# Patient Record
Sex: FEMALE | Race: WHITE | Employment: FULL TIME | URBAN - METROPOLITAN AREA
[De-identification: names, ages, dates, MRNs, and addresses within clinical notes are randomized per-mention and may not be internally consistent; named-entity substitution may affect disease eponyms.]

---

## 2024-04-25 ENCOUNTER — HOSPITAL ENCOUNTER (EMERGENCY)
Age: 24
Discharge: HOME | End: 2024-04-25
Payer: COMMERCIAL

## 2024-04-25 VITALS
WEIGHT: 244.71 LBS | SYSTOLIC BLOOD PRESSURE: 103 MMHG | OXYGEN SATURATION: 98 % | TEMPERATURE: 97.8 F | HEART RATE: 83 BPM | RESPIRATION RATE: 17 BRPM | HEIGHT: 70 IN | BODY MASS INDEX: 35.03 KG/M2 | DIASTOLIC BLOOD PRESSURE: 66 MMHG

## 2024-04-25 DIAGNOSIS — R55 SYNCOPE AND COLLAPSE: Primary | ICD-10-CM

## 2024-04-25 LAB
ANION GAP SERPL CALCULATED.3IONS-SCNC: 15 MMOL/L (ref 3–16)
BASOPHILS # BLD: 0.1 K/UL (ref 0–0.2)
BASOPHILS NFR BLD: 0.4 %
BUN SERPL-MCNC: 8 MG/DL (ref 7–20)
CALCIUM SERPL-MCNC: 9.9 MG/DL (ref 8.3–10.6)
CHLORIDE SERPL-SCNC: 101 MMOL/L (ref 99–110)
CO2 SERPL-SCNC: 22 MMOL/L (ref 21–32)
CREAT SERPL-MCNC: 0.5 MG/DL (ref 0.6–1.1)
DEPRECATED RDW RBC AUTO: 13.5 % (ref 12.4–15.4)
EOSINOPHIL # BLD: 0 K/UL (ref 0–0.6)
EOSINOPHIL NFR BLD: 0.1 %
GFR SERPLBLD CREATININE-BSD FMLA CKD-EPI: >90 ML/MIN/{1.73_M2}
GLUCOSE SERPL-MCNC: 118 MG/DL (ref 70–99)
HCT VFR BLD AUTO: 41.2 % (ref 36–48)
HGB BLD-MCNC: 13.5 G/DL (ref 12–16)
LYMPHOCYTES # BLD: 1.5 K/UL (ref 1–5.1)
LYMPHOCYTES NFR BLD: 7.8 %
MCH RBC QN AUTO: 28.7 PG (ref 26–34)
MCHC RBC AUTO-ENTMCNC: 32.9 G/DL (ref 31–36)
MCV RBC AUTO: 87.2 FL (ref 80–100)
MONOCYTES # BLD: 0.4 K/UL (ref 0–1.3)
MONOCYTES NFR BLD: 2.2 %
NEUTROPHILS # BLD: 17.3 K/UL (ref 1.7–7.7)
NEUTROPHILS NFR BLD: 89.5 %
PLATELET # BLD AUTO: 225 K/UL (ref 135–450)
PMV BLD AUTO: 10.2 FL (ref 5–10.5)
POTASSIUM SERPL-SCNC: 4.1 MMOL/L (ref 3.5–5.1)
RBC # BLD AUTO: 4.72 M/UL (ref 4–5.2)
SODIUM SERPL-SCNC: 138 MMOL/L (ref 136–145)
WBC # BLD AUTO: 19.3 K/UL (ref 4–11)

## 2024-04-25 PROCEDURE — 93005 ELECTROCARDIOGRAM TRACING: CPT | Performed by: EMERGENCY MEDICINE

## 2024-04-25 PROCEDURE — 80048 BASIC METABOLIC PNL TOTAL CA: CPT

## 2024-04-25 PROCEDURE — 99284 EMERGENCY DEPT VISIT MOD MDM: CPT

## 2024-04-25 PROCEDURE — 85025 COMPLETE CBC W/AUTO DIFF WBC: CPT

## 2024-04-25 ASSESSMENT — PAIN DESCRIPTION - PAIN TYPE: TYPE: ACUTE PAIN

## 2024-04-25 ASSESSMENT — PAIN DESCRIPTION - DESCRIPTORS: DESCRIPTORS: DISCOMFORT

## 2024-04-25 ASSESSMENT — PAIN - FUNCTIONAL ASSESSMENT: PAIN_FUNCTIONAL_ASSESSMENT: ACTIVITIES ARE NOT PREVENTED

## 2024-04-25 ASSESSMENT — PAIN SCALES - GENERAL: PAINLEVEL_OUTOF10: 4

## 2024-04-25 ASSESSMENT — PAIN DESCRIPTION - LOCATION: LOCATION: COCCYX

## 2024-04-25 ASSESSMENT — PAIN DESCRIPTION - ONSET: ONSET: SUDDEN

## 2024-04-26 LAB
EKG ATRIAL RATE: 78 BPM
EKG DIAGNOSIS: NORMAL
EKG P AXIS: 40 DEGREES
EKG P-R INTERVAL: 168 MS
EKG Q-T INTERVAL: 350 MS
EKG QRS DURATION: 76 MS
EKG QTC CALCULATION (BAZETT): 399 MS
EKG R AXIS: 28 DEGREES
EKG T AXIS: 48 DEGREES
EKG VENTRICULAR RATE: 78 BPM

## 2024-04-26 PROCEDURE — 93010 ELECTROCARDIOGRAM REPORT: CPT | Performed by: INTERNAL MEDICINE

## 2024-04-26 NOTE — ED PROVIDER NOTES
**ADVANCED PRACTICE PROVIDER, I HAVE EVALUATED THIS PATIENT**        [facility] EMERGENCY DEPARTMENT  EMERGENCY DEPARTMENT ENCOUNTER      Pt Name: Verena Faustin  MRN:[id number]  Birthdate 2000  Date of evaluation: 4/25/2024  Provider: Jose Shukla PA-C  Note Started: 11:12 PM EDT 4/25/24        Chief Complaint:    Chief Complaint   Patient presents with    Loss of Consciousness    Tailbone Pain         Nursing Notes, Past Medical Hx, Past Surgical Hx, Social Hx, Allergies, and Family Hx were all reviewed and agreed with or any disagreements were addressed in the HPI.    HPI: (Location, Duration, Timing, Severity, Quality, Assoc Sx, Context, Modifying factors)    History From: ***  {Limitations to history (Optional):42170}    {Social Determinants Significantly Affecting Health (Optional):57462}    Chief Complaint of ***    This is a  23 y.o. female who presents  ***    PastMedical/Surgical History:  History reviewed. No pertinent past medical history.  History reviewed. No pertinent surgical history.    Medications:  Previous Medications    No medications on file       Review of Systems:  (1 systems needed)  Review of Systems    \"Positives and Pertinent negatives as per HPI\"    Physical Exam:  Physical Exam    MEDICAL DECISION MAKING    Vitals:    Vitals:    04/25/24 2013 04/25/24 2144   BP: 103/66    Pulse: 83    Resp: 16 17   Temp: 97.8 °F (36.6 °C)    TempSrc: Oral    SpO2: 98% 98%   Weight: 111 kg (244 lb 11.4 oz)    Height: 1.778 m (5' 10\")        LABS:  Labs Reviewed   CBC WITH AUTO DIFFERENTIAL - Abnormal; Notable for the following components:       Result Value    WBC 19.3 (*)     Neutrophils Absolute 17.3 (*)     All other components within normal limits   BASIC METABOLIC PANEL - Abnormal; Notable for the following components:    Glucose 118 (*)     Creatinine 0.5 (*)     All other components within normal limits        Remainder of labs reviewed and were negative at this time or not  collapse        DISPOSITION Decision To Discharge 04/25/2024 11:12:33 PM      PATIENT REFERRED TO:  [facility] Emergency Department  [address] [address]  [phone]  Call   If symptoms worsen      DISCHARGE MEDICATIONS:  New Prescriptions    No medications on file       DISCONTINUED MEDICATIONS:  Discontinued Medications    No medications on file              (Please note the MDM and HPI sections of this note were completed with a voice recognition program.  Efforts were made to edit the dictations but occasionally words are mis-transcribed.)    Electronically signed, Jose Shukla PA-C,          Jose Shukla PA-C  05/03/24 0901

## 2024-04-26 NOTE — ED PROVIDER NOTES
I did not perform an evaluation of Verena Faustin but was asked to review her EKG.     EKG interpreted by myself.   Normal sinus rhythm with a rate of 78, normal axis, normal intervals, no ST elevations, no ST depressions, nonspecific T wave changes, no prior EKG on file.     Tray Simeon MD  04/25/24 2033

## 2024-04-26 NOTE — ED TRIAGE NOTES
Patient to the ER with complaints of syncopal episode after donating plasma.  Patient says she waited the required 20 minutes after donating, but when she got up to leave she lost her vision and fainted for approximately 15 seconds.  She does report hitting her head on the concrete floor during the episode.     At time of triage, patient is alert and oriented x4 and ambulatory.  She reports mild headache and some pain in her tailbone from the fall.

## 2024-04-26 NOTE — ED NOTES
D/C: Order noted for d/c. Pt confirmed d/c paperwork  have correct name. Discharge and education instructions reviewed with patient. Teach-back successful.  Pt verbalized understanding. Pt denied questions at this time. No acute distress noted. Patient instructed to follow-up as noted - return to emergency department if symptoms worsen. Patient verbalized understanding. Discharged per EDMD with discharge instructions. Pt discharged to private vehicle. Patient stable upon departure. Thanked patient for choosing [facility] for care.

## 2024-04-26 NOTE — DISCHARGE INSTRUCTIONS
Drink plenty of fluids  Return emergency room for any worsening  Get established with a primary care provider    [facility] Referral number [phone] for Primary Care      [facility] CLINICS/COMMUNITY HEALTH CENTERS  [facility]  [address] [address] [phone]  Fax [phone]  Medical, OB/Gyn, Pediatrics, [facility]  Serves all of [facility])  [address]  [place], [place] [address]  [phone]       [facility]  [address] [facility] (Administrative offices)  [phone]  Homeless only [facility] ([place])  [address] [facility] [facility], [place] [address]  [phone] or [phone], Singaporean [phone],   Dental Appointments [phone] or [phone]  Pediatric, Family Practice, X-Ray  Serves all of [facility] ([facility])  [address]  [place], [place] [address]  [phone]   [facility] [facility]   [address] [facility]    (Located in [facility])  [phone] or [phone], Singaporean [phone],   Dental Appointments [phone] or [phone]     [facility]  [address] [place], [place] [address]  [phone] [facility]  [address] [facility]  [phone]   [facility]  [address]  [id number] [phone] Fax [phone]  General Practice    Serves [place] and Surrounding areas [facility]  [address] [facility] [address]  [phone] Fax [phone]  Medical, OB/Gyn, Pediatrics  Dental Clinic, [facility] limits only     [facility]  [address] [facility] [address]  [phone] Fax [phone]  Family Practice, Pediatrics, OB/Gyn, [facility]  Dental Clinic [phone]  Serves [facility]  [address]    [phone]  Urgent Care, Open 24 hrs, Urgent care, Gyn, Prenatal, Dental Mental, Translators     [facility] [place]  [facility]   [address] [place] [address]. [place], [place] [phone]  (Located: [facility])  Pediatrics [phone], Romansh [phone],   Dental Appointments [phone] or [phone]  [facility] [phone] [facility]  [address] [address]  [phone]  Medical, OB/Gyn, Pediatrics, [facility]  Dental Clinic [phone]       [facility]  [address] [place]  [address]  [phone] Fax [phone]  Pediatrics, [facility]  [address] [address]  [phone]   Fax [phone]  Pediatrics, [facility]  [address] [address]  [phone]  Medical [address] [address]  [phone]  Pediatrics, Internal Med, OB/Gyn, [facility], Dental Clinic [phone]     [facility]  [address] [facility] [address]   [phone] [address] [address]  [phone] Fax [phone]  [facility]  Sliding scale fee  All [facility]  [address] [facility]  [place], [place] [address]  [phone]     [facility]  [address] [place] [address]  [phone]    [address]  [place], [place] [address]  [phone]         [facility] Subspecialties  [address] [place], [place] [address]  [facility]  [phone]  Fax:  [phone]  [facility]  [phone]  Fax:  [phone]  [facility]  Resident Practice  [phone]  Fax:  [phone]  Medical Specialty Center  [phone]  Fax:  [phone]  Orthopaedics  [phone]     [facility] ([facility] [place])  [address] [address]  [phone]    [facility] ([facility])    [facility]   ([facility] [place])  [facility]

## 2024-05-03 ASSESSMENT — ENCOUNTER SYMPTOMS
BACK PAIN: 0
VOMITING: 0
NAUSEA: 0
SHORTNESS OF BREATH: 0
ABDOMINAL PAIN: 0
COUGH: 0
SORE THROAT: 0
EYE PAIN: 0